# Patient Record
Sex: FEMALE | URBAN - METROPOLITAN AREA
[De-identification: names, ages, dates, MRNs, and addresses within clinical notes are randomized per-mention and may not be internally consistent; named-entity substitution may affect disease eponyms.]

---

## 2023-01-25 ENCOUNTER — APPOINTMENT (OUTPATIENT)
Dept: PHYSICAL THERAPY | Age: 63
End: 2023-01-25

## 2023-01-25 NOTE — PROGRESS NOTES
PHYSICAL THERAPY - DAILY TREATMENT NOTE    Patient Name: González Willams        Date: 2023  : 1960   YES Patient  Verified  Visit #:   1   of   ***  Insurance: Payor: Nikolai Cancel / Plan: Asim Fitzpatrick PPO / Product Type: PPO /      In time: *** Out time: ***   Total Treatment Time: ***     BCBS/Medicare Time Tracking (below)   Total Timed Codes (min):  *** 1:1 Treatment Time:  ***     TREATMENT AREA =  Pain in left knee [M25.562]  Pain in right knee [M25.561]    SUBJECTIVE  Pain Level (on 0 to 10 scale):  ***  / 10   Medication Changes/New allergies or changes in medical history, any new surgeries or procedures?     NO    If yes, update Summary List   Subjective Functional Status/Changes:  [x]  No changes reported     See eval/POC           Modalities Rationale:     decrease edema, decrease inflammation, and decrease pain to improve patient's ability to prevent soreness   min [] Estim, type/location:                                      []  att     []  unatt     []  w/US     []  w/ice    []  w/heat    min []  Mechanical Traction: type/lbs                   []  pro   []  sup   []  int   []  cont    []  before manual    []  after manual    min []  Ultrasound, settings/location:      min []  Iontophoresis w/ dexamethasone, location:                                               []  take home patch       []  in clinic   *** min []  Ice     []  Heat    location/position:     min []  Vasopneumatic Device, press/temp:     min []  Other:    [x] Skin assessment post-treatment (if applicable):    [x]  intact    []  redness- no adverse reaction     []redness - adverse reaction:        *** min Therapeutic Exercise:  [x]  See flow sheet   Rationale:      increase ROM, increase strength, and improve coordination to improve the patients ability to increase activity tolerance     *** min Manual Therapy:    Rationale:      {[facility] IMMOTION MANUAL THERAPY:31358} to improve patient's ability to ***  manual therapy interventions were performed at a separate and distinct time from the therapeutic activities interventions. min Therapeutic Activity: [x]  See flow sheet   Rationale:    {[facility] IMMOTION THER EX:83633} to improve the patients ability to ***     min Neuromuscular Re-ed: [x]  See flow sheet   Rationale:    {[facility] IMMOTION THER EX:83122} to improve the patients ability to ***     min Gait Training:  ___ feet with ___ device on level surfaces with ___ level of assistance   Rationale: To improve ambulation safety and efficiency in order to improve patient's ability to safely ambulate at home for self care. min Self Care:    Rationale:    {[facility] IMMOTION THER EX:29605} to improve the patients ability to ***    Billed With/As:   [] TE   [] TA   [] Neuro   [] Self Care Patient Education: [x] Review HEP    [] Progressed/Changed HEP based on:   [] positioning   [] body mechanics   [] transfers   [] heat/ice application    [] other:      Other Objective/Functional Measures:    FOTO - ***     Post Treatment Pain Level (on 0 to 10) scale:   ***  / 10     ASSESSMENT  Assessment/Changes in Function:     Pt has signs and symptoms suggestive of ***     []  See Progress Note/Recertification   Patient will continue to benefit from skilled PT services to modify and progress therapeutic interventions, address functional mobility deficits, address ROM deficits, address strength deficits, analyze and address soft tissue restrictions, analyze and cue movement patterns, analyze and modify body mechanics/ergonomics, and assess and modify postural abnormalities to attain remaining goals.    Progress toward goals / Updated goals:    Goals established today     PLAN  [x]  Upgrade activities as tolerated YES Continue plan of care   []  Discharge due to :    []  Other:      Therapist: Connie Cesar, PT    Date: 1/25/2023 Time: 11:50 AM     Future Appointments   Date Time Provider Sammy Castellanos   1/25/2023  1:30 PM Corey Truong PT [facility] [place]

## 2023-01-25 NOTE — PROGRESS NOTES
[address] [place] [address] PHYSICAL THERAPY AT [address] [place] [address] [address]ass [address], [address] ,[address], [address]  Phone: [phone]  Fax: [phone] / [address] [place] [address] PHYSICAL THERAPY SERVICES  Patient Name: Fernandez Norton : 1960   Medical   Diagnosis: Pain in left knee [M25.562]  Pain in right knee [M25.561] Treatment Diagnosis: Bilateral Knee Pain   Onset Date: ***     Referral Source: Bernardo Castaneda MD [facility] [place]): 2023   Prior Hospitalization: See medical history Provider #: [id number]   Prior Level of Function: ***   Comorbidities: ***   Medications: Verified on Patient Summary List   The Plan of Care and following information is based on the information from the initial evaluation.   ===========================================================================================  Assessment / key information:  61 y/o female presents to PT w c/o  ***  ===========================================================================================  Eval Complexity: History {PT OP History:}; Examination  {PT OP Examination:}; Presentation {PT OP Presentation:93137}; Decision Making {PT OP Decision Makin};  Overall Complexity {PT OP Complexity:38918}  Problem List: {[facility] IP PROBLEM LIST:96725:a}   Treatment Plan may include any combination of the following: Therapeutic exercise, Therapeutic activities, Neuromuscular re-education, Physical agent/modality, Gait/balance training, Manual therapy, Dry Needling, Aquatic therapy, Patient education, Self Care training, Functional mobility training, Home safety training and Stair training  Patient / Family readiness to learn indicated by: asking questions, trying to perform skills and interest  Persons(s) to be included in education: { Persons Education:57708}  Barriers to Learning/Limitations: None  Measures taken:    Patient Goal (s): \"***\"   Patient self reported health status: {Outpt PT Rehabilitation Potential:47460}  Rehabilitation Potential: good  Short Term Goals: To be accomplished in  ***  weeks:  Pt independent with basic HEP for ***.  ***  Long Term Goals: To be accomplished in  ***  weeks:  Pt to increase FOTO score from *** to >/= ***. Pt independent with high level HEP for ***.  ***  Frequency / Duration:   Patient to be seen  ***  times per week for ***  weeks:  Patient / Caregiver education and instruction: {Outpt PT patient caregiver ed.:13638:a}    Therapist Signature: Chaim Arora, PT Date: 4/33/6074   Certification Period: na Time: 11:49 AM   ===========================================================================================  I certify that the above Physical Therapy Services are being furnished while the patient is under my care. I agree with the treatment plan and certify that this therapy is necessary. Physician Signature:        Date:       Time:  _     Per Wilcox MD  Please sign and return to [facility] or you may fax the signed copy to [phone]. Thank you.

## 2023-02-02 ENCOUNTER — HOSPITAL ENCOUNTER (OUTPATIENT)
Dept: PHYSICAL THERAPY | Age: 63
Discharge: HOME | End: 2023-02-02
Payer: COMMERCIAL

## 2023-02-02 PROCEDURE — 97535 SELF CARE MNGMENT TRAINING: CPT

## 2023-02-02 PROCEDURE — 97162 PT EVAL MOD COMPLEX 30 MIN: CPT
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
## 2023-02-02 NOTE — PROGRESS NOTES
PHYSICAL THERAPY - DAILY TREATMENT NOTE    Patient Name: Jt Stahl        Date: 2023  : 1960   YES Patient  Verified  Visit #:   1   of   16  Insurance: Payor: Deniz Fishman / Plan: Donna Lamar PPO / Product Type: PPO /      In time: 149 Out time: 252   Total Treatment Time: 63     Medicare/[facility] Time Tracking (below)   Total Timed Codes (min):  NA 1:1 Treatment Time:  NA     TREATMENT AREA =  Left knee pain [M25.562]  Right knee pain [M25.561]    SUBJECTIVE    Pain Level (on 0 to 10 scale):  5  / 10 knee and 8/10 low back   Medication Changes/New allergies or changes in medical history, any new surgeries or procedures?     NO    If yes, update Summary List   Subjective Functional Status/Changes:  []  No changes reported     See POC          OBJECTIVE    15 min Self Care: Reviewed diagnosis, prognosis, therapy progression  Reviewed and educated on HEP   discussion balance progression to reduce fall risk  Review of pathology of knee OA and hip tendinitis/bursitis    Rationale:    Improve understanding of injury and therapy to have realistic expectation of therapy to improve compliance/adherence and satisfaction    Billed With/As:   [] TE   [] TA   [] Neuro   [x] Self Care Patient Education: [x] Review HEP    [] Progressed/Changed HEP based on:   [] positioning   [] body mechanics   [] transfers   [] heat/ice application    [] other:        Other Objective/Functional Measures:    Shown and performed HEP     Post Treatment Pain Level (on 0 to 10) scale:   5 / 10     ASSESSMENT  Assessment/Changes in Function:     See POC     []  See Progress Note/Recertification   Patient will continue to benefit from skilled PT services to modify and progress therapeutic interventions, address functional mobility deficits, address ROM deficits, address strength deficits, analyze and address soft tissue restrictions, analyze and cue movement patterns, analyze and modify body mechanics/ergonomics, assess and modify postural abnormalities, and instruct in home and community integration to attain goals per POC.    Progress toward goals / Updated goals:    See POC     PLAN  [x]  Upgrade activities as tolerated YES Continue plan of care   []  Discharge due to :    [x]  Other: 2x per week for 8 weeks     Therapist: Wilmar Bowen PT    Date: 2/2/2023 Time: 9:50 AM     Future Appointments   Date Time Provider Sammy Castellanos   2/2/2023  1:30 PM Monisha Mendosa, PT [facility]

## 2023-03-14 ENCOUNTER — HOSPITAL ENCOUNTER (OUTPATIENT)
Facility: HOSPITAL | Age: 63
Setting detail: RECURRING SERIES
Discharge: HOME | End: 2023-03-17
Payer: COMMERCIAL

## 2023-03-14 PROCEDURE — 97535 SELF CARE MNGMENT TRAINING: CPT

## 2023-03-14 PROCEDURE — 97110 THERAPEUTIC EXERCISES: CPT

## 2023-03-14 NOTE — THERAPY RECERTIFICATION
[address] [facility] PHYSICAL THERAPY  [address] [facility], [address], [place], [address] [place] [address] Ph: [phone] Fx: [phone]  PHYSICAL THERAPY PROGRESS NOTE  Patient Name: Davis Penaloza : 1960   Treatment/Medical Diagnosis: B knee pain   Referral Source: No ref. provider found     Date of Initial Visit: 23 Attended Visits: 2 Missed Visits: 1     SUMMARY OF TREATMENT  Pt has been evaluated/assessed and only attended one session since starting PT, the initial evaluation. SUBJECTIVE: pt reports of stiffness in the knee and B hips and notes some pain back, knees, and hips. She was gone for about 4 weeks and had to do a lot of stairs and walking while on vacation. She tried some of the exercises but lost the handout while on vacation. CURRENT STATUS  Pt has made minimal to no progress in PT towards her goals. She also has not been into PT for >1 month which affects her progress. Pt notes having been performing the HEP somewhat as prescribed while she had it available. Pt will continue to benefit from skilled PT to progress exercises and improve upon?functional activities. Short Term Goals  1) Pt will be IND with HEP to facilitate self care management. 2) Pt will improve romberg stance to >20 sec no LOB and EC romberg stance >10 sec no LOB for safety with stationary balance activities at home. 3) Pt will improve worst pain levels from initial evaluation level of 10/10 to 5/10 to show improved QOL and improved overall perception of pain-free/more pain-free function with ADLs. MINIMAL PROGRESS towards goals. New Goals to be achieved in __6__ weeks  Same as above    RECOMMENDATIONS  Continue with therapy 2x/week for 6 weeks to further improve upon B LE and lumbar ROM, strength, stability, balance and functional activities before transitioning to DC with HEP. Please advise. Thank you.     If you have any questions/comments please contact us directly at [phone]. Thank you for allowing us to assist in the care of your patient.     Kirk Gutierrez, PT       3/14/2023       10:14 AM

## 2023-03-14 NOTE — PROGRESS NOTES
PHYSICAL / OCCUPATIONAL THERAPY - DAILY TREATMENT NOTE (updated )    Patient Name: Steve Singh    Date: 3/14/2023    : 1960  Insurance: Payor: /      Patient  verified Yes     Visit #   Current / Total 2 12   Time   In / Out 535 615   Pain   In / Out 5/10 3/10   Subjective Functional Status/Changes: See PN   Changes to:  Meds, Allergies, Med Hx, Sx Hx? If yes, update Summary List no       TREATMENT AREA =   B knee pain    OBJECTIVE    Modalities Rationale:     decrease pain and increase tissue extensibility to improve patient's ability to progress to PLOF and address remaining functional goals. min [] Estim Unattended, type/location:                                      []  w/ice    []  w/heat    min [] Estim Attended, type/location:                                     []  w/US     []  w/ice    []  w/heat    []  TENS insruct      min []  Mechanical Traction: type/lbs                   []  pro   []  sup   []  int   []  cont    []  before manual    []  after manual    min []  Ultrasound, settings/location:      min []  Iontophoresis w/ dexamethasone, location:                                               []  take home patch       []  in clinic   10 min  unbill []  Ice     [x]  Heat    location/position: Supine LS with extra towel    min []  Paraffin,  details:     min []  Vasopneumatic Device, press/temp:     min []  Clabe Divine / Loletha Messenger: If using vaso (only need to measure limb vaso being performed on)      pre-treatment girth :       post-treatment girth :       measured at (landmark location) :      min []  Other:    Skin assessment post-treatment (if applicable):    []  intact    []  redness- no adverse reaction                 []redness - adverse reaction:         Therapeutic Procedures:   Tx Min Billable or 1:1 Min (if diff from Tx Min) Procedure, Rationale, Specifics   20  58035 Therapeutic Exercise (timed):  increase ROM, strength, coordination, balance, and proprioception to improve patient's ability to progress to PLOF and address remaining functional goals. (see flow sheet as applicable)     Details if applicable:       10  [id number] Self Care/Home Management (timed):  improve patient knowledge and understanding of pain reducing techniques, positioning, posture/ergonomics, home safety, activity modification, diagnosis/prognosis, and physical therapy expectations, procedures and progression  to improve patient's ability to progress to PLOF and address remaining functional goals. (see flow sheet as applicable)     Details if applicable:            Details if applicable:            Details if applicable:            Details if applicable:     27  [facility] Totals Reminder: bill using total billable min of TIMED therapeutic procedures (example: do not include dry needle or estim unattended, both untimed codes, in totals to left)  8-22 min = 1 unit; 23-37 min = 2 units; 38-52 min = 3 units; 53-67 min = 4 units; 68-82 min = 5 units   Total Total     [x]  Patient Education billed concurrently with other procedures   [x] Review HEP    [] Progressed/Changed HEP, detail:    [] Other detail:       Objective Information/Functional Measures/Assessment    Patient tolerated today's treatment well with the initiation of FS exercises. Performed at less reps today without much pain    Patient will continue to benefit from skilled PT / OT services to modify and progress therapeutic interventions, analyze and address functional mobility deficits, analyze and address ROM deficits, analyze and address strength deficits, analyze and address soft tissue restrictions, analyze and cue for proper movement patterns, analyze and modify for postural abnormalities, and instruct in home and community integration to address functional deficits and attain remaining goals.     Progress toward goals / Updated goals:  [x]  See Progress Note/Recertification    New Goals to be achieved in __6__ weeks  1) Pt will be IND with HEP to facilitate self care management. 2) Pt will improve romberg stance to >20 sec no LOB and EC romberg stance >10 sec no LOB for safety with stationary balance activities at home. 3) Pt will improve worst pain levels from initial evaluation level of 10/10 to 5/10 to show improved QOL and improved overall perception of pain-free/more pain-free function with ADLs. All goals remain applicable at this time.      PLAN  Yes  Continue plan of care  [x]  Upgrade activities as tolerated  []  Discharge due to :  []  Other:    Isabel Borjas, PT    3/14/2023    7:14 PM    Future Appointments   Date Time Provider Jamal Souza   3/16/2023 12:30 PM Prem Avalos, [facility]   3/21/2023 11:30 AM Prem Avalos, [facility]   3/24/2023  1:30 PM Isabel Borjas PT [facility]   3/28/2023 12:00 PM Prem Avalos, [facility]   3/30/2023 12:30 PM Prem Avalos PTA [facility]   4/4/2023  2:00 PM Prem Avalos, PTA [facility]   4/6/2023  1:00 PM Isabel Borjas, PT [facility]   4/11/2023  4:30 PM Isabel Borjas PT [facility]   4/13/2023  1:00 PM Isabel Borjas PT [facility]

## 2023-03-16 ENCOUNTER — HOSPITAL ENCOUNTER (OUTPATIENT)
Facility: HOSPITAL | Age: 63
Setting detail: RECURRING SERIES
Discharge: HOME | End: 2023-03-19
Payer: COMMERCIAL

## 2023-03-16 PROCEDURE — 97112 NEUROMUSCULAR REEDUCATION: CPT

## 2023-03-16 PROCEDURE — 97110 THERAPEUTIC EXERCISES: CPT

## 2023-03-16 NOTE — PROGRESS NOTES
first treatment session. No change in functional measurements today. Patient will continue to benefit from skilled PT / OT services to modify and progress therapeutic interventions, analyze and address functional mobility deficits, analyze and address ROM deficits, analyze and address strength deficits, analyze and address soft tissue restrictions, analyze and cue for proper movement patterns, analyze and modify for postural abnormalities, and instruct in home and community integration to address functional deficits and attain remaining goals. Progress toward goals / Updated goals:  [x]  See Progress Note/Recertification    New Goals to be achieved in __6__ weeks  1) Pt will be IND with HEP to facilitate self care management. 2) Pt will improve romberg stance to >20 sec no LOB and EC romberg stance >10 sec no LOB for safety with stationary balance activities at home. 3) Pt will improve worst pain levels from initial evaluation level of 10/10 to 5/10 to show improved QOL and improved overall perception of pain-free/more pain-free function with ADLs. All goals remain applicable at this time.      PLAN  Yes  Continue plan of care  [x]  Upgrade activities as tolerated  []  Discharge due to :  []  Other:    Alma Miramontes PTA    3/16/2023    8:44 AM    Future Appointments   Date Time Provider Jamal Souza   3/16/2023 12:30 PM Alma Miramontes PTA [facility]   3/21/2023 11:30 AM Alma Miramontes PTA [facility]   3/24/2023  1:30 PM Camelia Menchaca PT [facility]   3/28/2023 12:00 PM Alma Miramontes PTA [facility]   3/30/2023 12:30 PM Alma Miramontes PTA [facility]   4/4/2023  2:00 PM Alma Miramontes PTA [facility]   4/6/2023  1:00 PM Camelia Menchaca PT [facility]   4/11/2023  4:30 PM Camelia Menchaca PT [facility]   4/13/2023  1:00 PM Camelia Menchaca PT [facility]

## 2023-03-21 ENCOUNTER — HOSPITAL ENCOUNTER (OUTPATIENT)
Facility: HOSPITAL | Age: 63
Setting detail: RECURRING SERIES
Discharge: HOME | End: 2023-03-24
Payer: COMMERCIAL

## 2023-03-21 PROCEDURE — 97112 NEUROMUSCULAR REEDUCATION: CPT

## 2023-03-21 PROCEDURE — 97110 THERAPEUTIC EXERCISES: CPT

## 2023-03-21 NOTE — PROGRESS NOTES
PHYSICAL / OCCUPATIONAL THERAPY - DAILY TREATMENT NOTE (updated )    Patient Name: Sailaja Cho    Date: 3/21/2023    : 1960  Insurance: Payor: Camryn Wilks / Plan: Camryn Wilks / Product Type: *No Product type* /      Patient  verified Yes     Visit #   Current / Total 4 12   Time   In / Out 1130 1210   Pain   In / Out 2 3   Subjective Functional Status/Changes: I was real sore this weekend and I did my exercises. Changes to:  Meds, Allergies, Med Hx, Sx Hx? If yes, update Summary List no       TREATMENT AREA =   B knee pain    OBJECTIVE    Modalities Rationale:     decrease pain and increase tissue extensibility to improve patient's ability to progress to PLOF and address remaining functional goals. min [] Estim Unattended, type/location:                                      []  w/ice    []  w/heat    min [] Estim Attended, type/location:                                     []  w/US     []  w/ice    []  w/heat    []  TENS insruct      min []  Mechanical Traction: type/lbs                   []  pro   []  sup   []  int   []  cont    []  before manual    []  after manual    min []  Ultrasound, settings/location:      min []  Iontophoresis w/ dexamethasone, location:                                               []  take home patch       []  in clinic    min  unbill []  Ice     [x]  Heat    location/position: Supine LS with extra towel    min []  Paraffin,  details:     min []  Vasopneumatic Device, press/temp:     min []  Valere Fridge / Junita Books: If using vaso (only need to measure limb vaso being performed on)      pre-treatment girth :       post-treatment girth :       measured at (landmark location) :      min []  Other:    Skin assessment post-treatment (if applicable):    []  intact    []  redness- no adverse reaction                 []redness - adverse reaction:         Therapeutic Procedures:   Tx Min Billable or 1:1 Min (if diff from [employer]) Procedure, Rationale, Specifics     [id number] Manual

## 2023-03-24 ENCOUNTER — APPOINTMENT (OUTPATIENT)
Facility: HOSPITAL | Age: 63
End: 2023-03-24
Payer: COMMERCIAL

## 2023-03-28 ENCOUNTER — APPOINTMENT (OUTPATIENT)
Facility: HOSPITAL | Age: 63
End: 2023-03-28
Payer: COMMERCIAL

## 2023-03-30 ENCOUNTER — HOSPITAL ENCOUNTER (OUTPATIENT)
Facility: HOSPITAL | Age: 63
Setting detail: RECURRING SERIES
End: 2023-03-30
Payer: COMMERCIAL

## 2023-03-30 PROCEDURE — 97112 NEUROMUSCULAR REEDUCATION: CPT

## 2023-03-30 PROCEDURE — 97110 THERAPEUTIC EXERCISES: CPT

## 2023-03-30 NOTE — PROGRESS NOTES
PHYSICAL / OCCUPATIONAL THERAPY - DAILY TREATMENT NOTE (updated )    Patient Name: Quang Raygoza    Date: 3/30/2023    : 1960  Insurance: Payor: Davis Hatch / Plan: Davis Hatch / Product Type: *No Product type* /      Patient  verified Yes     Visit #   Current / Total 5 12   Time   In / Out 1230 115   Pain   In / Out 1 1   Subjective Functional Status/Changes: My pain is ok, but my   (L) knee keeps going in and out. Changes to:  Meds, Allergies, Med Hx, Sx Hx? If yes, update Summary List no       TREATMENT AREA =   B knee pain    OBJECTIVE    Modalities Rationale:     decrease pain and increase tissue extensibility to improve patient's ability to progress to PLOF and address remaining functional goals. min [] Estim Unattended, type/location:                                      []  w/ice    []  w/heat    min [] Estim Attended, type/location:                                     []  w/US     []  w/ice    []  w/heat    []  TENS insruct      min []  Mechanical Traction: type/lbs                   []  pro   []  sup   []  int   []  cont    []  before manual    []  after manual    min []  Ultrasound, settings/location:      min []  Iontophoresis w/ dexamethasone, location:                                               []  take home patch       []  in clinic    min  unbill []  Ice     [x]  Heat    location/position: Supine LS with extra towel    min []  Paraffin,  details:     min []  Vasopneumatic Device, press/temp:     min []  Yosvany Cuming / Barnstable Spearing: If using vaso (only need to measure limb vaso being performed on)      pre-treatment girth :       post-treatment girth :       measured at (landmark location) :      min []  Other:    Skin assessment post-treatment (if applicable):    []  intact    []  redness- no adverse reaction                 []redness - adverse reaction:         Therapeutic Procedures:   Tx Min Billable or 1:1 Min (if diff from [employer]) Procedure, Rationale, Specifics     [id number] Manual Patient has been performing HEP well. Noting less pain overall, but still reporting (B) knee weakness    Patient will continue to benefit from skilled PT / OT services to modify and progress therapeutic interventions, analyze and address functional mobility deficits, analyze and address ROM deficits, analyze and address strength deficits, analyze and address soft tissue restrictions, analyze and cue for proper movement patterns, analyze and modify for postural abnormalities, and instruct in home and community integration to address functional deficits and attain remaining goals. Progress toward goals / Updated goals:  [x]  See Progress Note/Recertification    New Goals to be achieved in __6__ weeks  1) Pt will be IND with HEP to facilitate self care management. 2) Pt will improve romberg stance to >20 sec no LOB and EC romberg stance >10 sec no LOB for safety with stationary balance activities at home. 3) Pt will improve worst pain levels from initial evaluation level of 10/10 to 5/10 to show improved QOL and improved overall perception of pain-free/more pain-free function with ADLs. All goals remain applicable at this time.      PLAN  Yes  Continue plan of care  [x]  Upgrade activities as tolerated  []  Discharge due to :  []  Other:    Nayana Arevalo PTA    3/30/2023    9:08 AM    Future Appointments   Date Time Provider Jamal Souza   3/30/2023 12:30 PM Nayana Arevalo PTA [facility]   4/4/2023  2:00 PM Nayana Arevalo PTA [facility]   4/6/2023  1:00 PM Sue Hunt, PT [facility]   4/11/2023 12:00 PM Nayana Arevalo PTA [facility]   4/13/2023  1:00 PM Sue Hunt, PT [facility]

## 2023-04-04 ENCOUNTER — HOSPITAL ENCOUNTER (OUTPATIENT)
Facility: HOSPITAL | Age: 63
Setting detail: RECURRING SERIES
Discharge: HOME | End: 2023-04-07
Payer: COMMERCIAL

## 2023-04-04 PROCEDURE — 97110 THERAPEUTIC EXERCISES: CPT

## 2023-04-04 PROCEDURE — 97112 NEUROMUSCULAR REEDUCATION: CPT

## 2023-04-04 NOTE — PROGRESS NOTES
PHYSICAL / OCCUPATIONAL THERAPY - DAILY TREATMENT NOTE (updated )    Patient Name: Tiffanie Whitley    Date: 2023    : 1960  Insurance: Payor: Chavo Gomez / Plan: Chavo Gomez / Product Type: *No Product type* /      Patient  verified Yes     Visit #   Current / Total 6 12   Time   In / Out 205 300   Pain   In / Out 0 0   Subjective Functional Status/Changes: The pain isn't always present, just depends on what i'm doing. Changes to:  Meds, Allergies, Med Hx, Sx Hx? If yes, update Summary List no       TREATMENT AREA =   B knee pain    OBJECTIVE    Modalities Rationale:     decrease pain and increase tissue extensibility to improve patient's ability to progress to PLOF and address remaining functional goals. min [] Estim Unattended, type/location:                                      []  w/ice    []  w/heat    min [] Estim Attended, type/location:                                     []  w/US     []  w/ice    []  w/heat    []  TENS insruct      min []  Mechanical Traction: type/lbs                   []  pro   []  sup   []  int   []  cont    []  before manual    []  after manual    min []  Ultrasound, settings/location:      min []  Iontophoresis w/ dexamethasone, location:                                               []  take home patch       []  in clinic    min  unbill []  Ice     [x]  Heat    location/position: Supine LS with extra towel    min []  Paraffin,  details:     min []  Vasopneumatic Device, press/temp:     min []  Solo Marya / Manuel Humberto: If using vaso (only need to measure limb vaso being performed on)      pre-treatment girth :       post-treatment girth :       measured at (landmark location) :      min []  Other:    Skin assessment post-treatment (if applicable):    []  intact    []  redness- no adverse reaction                 []redness - adverse reaction:         Therapeutic Procedures:   Tx Min Billable or 1:1 Min (if diff from [place]) Procedure, Rationale, Specifics     [id number] Manual

## 2023-04-18 ENCOUNTER — HOSPITAL ENCOUNTER (OUTPATIENT)
Facility: HOSPITAL | Age: 63
Setting detail: RECURRING SERIES
Discharge: HOME | End: 2023-04-21
Payer: COMMERCIAL

## 2023-04-18 PROCEDURE — 97535 SELF CARE MNGMENT TRAINING: CPT

## 2023-04-18 PROCEDURE — 97112 NEUROMUSCULAR REEDUCATION: CPT

## 2023-04-18 PROCEDURE — 97110 THERAPEUTIC EXERCISES: CPT

## 2023-04-18 NOTE — PROGRESS NOTES
PHYSICAL / OCCUPATIONAL THERAPY - DAILY TREATMENT NOTE (updated )    Patient Name: Agustín Rodriguez    Date: 2023    : 1960  Insurance: Payor: Latricia Dumas / Plan: Latricia Dumas / Product Type: *No Product type* /      Patient  verified Yes     Visit #   Current / Total 10 20   Time   In / Out 220 320   Pain   In / Out 5 0   Subjective Functional Status/Changes: I really do feel like i'm doin much better and getting stronger, but my knee bothers me. Changes to:  Meds, Allergies, Med Hx, Sx Hx? If yes, update Summary List no       TREATMENT AREA =   B knee pain    OBJECTIVE    Modalities Rationale:     decrease pain and increase tissue extensibility to improve patient's ability to progress to PLOF and address remaining functional goals. min [] Estim Unattended, type/location:                                      []  w/ice    []  w/heat    min [] Estim Attended, type/location:                                     []  w/US     []  w/ice    []  w/heat    []  TENS insruct      min []  Mechanical Traction: type/lbs                   []  pro   []  sup   []  int   []  cont    []  before manual    []  after manual    min []  Ultrasound, settings/location:      min []  Iontophoresis w/ dexamethasone, location:                                               []  take home patch       []  in clinic    min  unbill []  Ice     [x]  Heat    location/position: Supine LS with extra towel    min []  Paraffin,  details:     min []  Vasopneumatic Device, press/temp:     min []  Bruce Henry / Eldon Loges: If using vaso (only need to measure limb vaso being performed on)      pre-treatment girth :       post-treatment girth :       measured at (landmark location) :      min []  Other:    Skin assessment post-treatment (if applicable):    []  intact    []  redness- no adverse reaction                 []redness - adverse reaction:         Therapeutic Procedures:   Tx Min Billable or 1:1 Min (if diff from [employer]) Procedure,

## 2023-04-20 ENCOUNTER — APPOINTMENT (OUTPATIENT)
Facility: HOSPITAL | Age: 63
End: 2023-04-20
Payer: COMMERCIAL

## 2023-04-25 ENCOUNTER — APPOINTMENT (OUTPATIENT)
Facility: HOSPITAL | Age: 63
End: 2023-04-25
Payer: COMMERCIAL

## 2023-04-27 ENCOUNTER — HOSPITAL ENCOUNTER (OUTPATIENT)
Facility: HOSPITAL | Age: 63
Setting detail: RECURRING SERIES
End: 2023-04-27
Payer: COMMERCIAL